# Patient Record
Sex: FEMALE | Race: BLACK OR AFRICAN AMERICAN | NOT HISPANIC OR LATINO | Employment: FULL TIME | URBAN - METROPOLITAN AREA
[De-identification: names, ages, dates, MRNs, and addresses within clinical notes are randomized per-mention and may not be internally consistent; named-entity substitution may affect disease eponyms.]

---

## 2023-09-13 LAB
CHLAMYDIA TRACH., AMPLIFIED: NEGATIVE
CLUE CELLS: PRESENT
N. GONORRHEA, AMPLIFIED: NEGATIVE
NUGENT SCORE: 8
TRICHOMONAS VAGINALIS: NEGATIVE
YEAST: ABNORMAL

## 2023-09-15 LAB — URINE CULTURE: ABNORMAL

## 2023-09-30 ENCOUNTER — OFFICE VISIT (OUTPATIENT)
Dept: URGENT CARE | Facility: CLINIC | Age: 23
End: 2023-09-30
Payer: COMMERCIAL

## 2023-09-30 VITALS
RESPIRATION RATE: 18 BRPM | DIASTOLIC BLOOD PRESSURE: 76 MMHG | BODY MASS INDEX: 18.72 KG/M2 | TEMPERATURE: 98 F | OXYGEN SATURATION: 98 % | SYSTOLIC BLOOD PRESSURE: 121 MMHG | HEART RATE: 73 BPM | WEIGHT: 116 LBS

## 2023-09-30 DIAGNOSIS — R11.0 NAUSEA: ICD-10-CM

## 2023-09-30 DIAGNOSIS — Z34.90 PREGNANCY, UNSPECIFIED GESTATIONAL AGE (HHS-HCC): Primary | ICD-10-CM

## 2023-09-30 LAB — PREGNANCY TEST URINE, POC: POSITIVE

## 2023-09-30 PROCEDURE — 99213 OFFICE O/P EST LOW 20 MIN: CPT | Performed by: PHYSICIAN ASSISTANT

## 2023-09-30 PROCEDURE — 81025 URINE PREGNANCY TEST: CPT | Performed by: PHYSICIAN ASSISTANT

## 2023-09-30 ASSESSMENT — PAIN SCALES - GENERAL: PAINLEVEL: 5

## 2023-09-30 ASSESSMENT — ENCOUNTER SYMPTOMS
ROS GI COMMENTS: HEARTBURN
NAUSEA: 1

## 2023-09-30 NOTE — PROGRESS NOTES
Subjective   Patient ID: Alejandro Butt is a 23 y.o. female.    Patient is a 23-year-old female who complains of upset stomach and nausea that she has been experiencing for the past several days.  Patient denies fever, chills, congestion, sinus pressure, sore throat, cough or other illness symptoms.  Patient reports no dysuria or hematuria.  Upon initial triage by the RN, patient states that she may be pregnant.  Patient reports that she did not take a pregnancy test prior to arrival at this urgent care facility today.  Patient denies abdominal pain, pelvic pain, vaginal bleeding or diarrhea.  Patient denies history of GERD, hiatal hernia, gallbladder disease or other GI conditions.            The following portions of the chart were reviewed this encounter and updated as appropriate:         Review of Systems   Gastrointestinal:  Positive for nausea.        Heartburn   All other systems reviewed and are negative.    Objective   Physical Exam  Vitals and nursing note reviewed.   Constitutional:       Appearance: Normal appearance. She is normal weight.   HENT:      Head: Normocephalic.      Nose: Nose normal.      Mouth/Throat:      Mouth: Mucous membranes are moist.      Pharynx: Oropharynx is clear.   Cardiovascular:      Pulses: Normal pulses.   Pulmonary:      Effort: Pulmonary effort is normal.      Breath sounds: Normal breath sounds.   Abdominal:      General: Abdomen is flat. Bowel sounds are normal. There is no distension.      Palpations: Abdomen is soft.      Tenderness: There is no abdominal tenderness. There is no guarding.   Skin:     General: Skin is warm and dry.   Neurological:      General: No focal deficit present.      Mental Status: She is alert and oriented to person, place, and time.   Psychiatric:         Mood and Affect: Mood normal.         Behavior: Behavior normal.         Thought Content: Thought content normal.         Judgment: Judgment normal.     Patient is awake, alert and oriented  in no acute distress.  Patient appears relaxed comfortable sitting in the exam room chair.  Procedures    Assessment/Plan   Physical exam findings as noted above.  Urine hCG is positive.  Patient was advised of the positive result and the patient was advised to schedule appointment with her OB/GYN for initial evaluation and ongoing prenatal care.    CLINICAL IMPRESSION:  Pregnancy

## 2023-09-30 NOTE — PROGRESS NOTES
Subjective   Patient ID: Alejandro Butt is a 23 y.o. female.    HPI    The following portions of the chart were reviewed this encounter and updated as appropriate:         Review of Systems  Objective   Physical Exam  Procedures    Assessment/Plan

## 2023-09-30 NOTE — LETTER
September 30, 2023     Patient: Alejandro Butt   YOB: 2000   Date of Visit: 9/30/2023       To Whom it May Concern:    Alejandro Butt was seen in my clinic on 9/30/2023.    If you have any questions or concerns, please don't hesitate to call.         Sincerely,          Isaac Arreaga PA-C        CC: No Recipients

## 2023-10-04 PROBLEM — N89.8 WHITE VAGINAL DISCHARGE: Status: ACTIVE | Noted: 2022-07-02

## 2023-10-04 PROBLEM — A60.00 GENITAL HERPES: Status: ACTIVE | Noted: 2022-07-02

## 2023-10-04 PROBLEM — R82.90 ABNORMAL URINE ODOR: Status: ACTIVE | Noted: 2023-10-04

## 2023-10-04 PROBLEM — B95.1 GBS (GROUP B STREPTOCOCCUS) UTI COMPLICATING PREGNANCY (HHS-HCC): Status: ACTIVE | Noted: 2023-10-04

## 2023-10-04 PROBLEM — N89.8 VAGINAL ITCHING: Status: ACTIVE | Noted: 2023-10-04

## 2023-10-04 PROBLEM — O23.40 GBS (GROUP B STREPTOCOCCUS) UTI COMPLICATING PREGNANCY (HHS-HCC): Status: ACTIVE | Noted: 2023-10-04

## 2023-10-04 PROBLEM — R35.0 URINARY FREQUENCY: Status: ACTIVE | Noted: 2023-10-04

## 2023-10-04 PROBLEM — N63.10 BREAST MASS, RIGHT: Status: ACTIVE | Noted: 2023-10-04

## 2023-10-04 PROBLEM — A74.9 CHLAMYDIA: Status: ACTIVE | Noted: 2023-10-04

## 2023-10-04 PROBLEM — D72.819 LEUKOPENIA: Status: ACTIVE | Noted: 2022-07-02

## 2023-10-04 PROBLEM — B37.31 CANDIDIASIS OF VAGINA: Status: ACTIVE | Noted: 2023-10-04

## 2023-10-04 PROBLEM — B37.31 VAGINAL YEAST INFECTION: Status: ACTIVE | Noted: 2023-10-04

## 2023-10-04 PROBLEM — B96.89 BACTERIAL VAGINOSIS: Status: ACTIVE | Noted: 2023-10-04

## 2023-10-04 PROBLEM — L81.9 DISORDER OF PIGMENTATION, UNSPECIFIED: Status: ACTIVE | Noted: 2021-01-27

## 2023-10-04 PROBLEM — N88.8 FRIABLE CERVIX: Status: ACTIVE | Noted: 2023-10-04

## 2023-10-04 PROBLEM — R10.9 ABDOMINAL DISCOMFORT: Status: ACTIVE | Noted: 2023-10-04

## 2023-10-04 PROBLEM — N76.0 BACTERIAL VAGINOSIS: Status: ACTIVE | Noted: 2023-10-04

## 2023-10-04 RX ORDER — CLINDAMYCIN PHOSPHATE 10 UG/ML
1 LOTION TOPICAL
COMMUNITY
Start: 2018-09-24 | End: 2023-10-05 | Stop reason: ALTCHOICE

## 2023-10-04 RX ORDER — IBUPROFEN 600 MG/1
1 TABLET ORAL EVERY 6 HOURS
COMMUNITY
Start: 2020-09-02 | End: 2023-10-05 | Stop reason: ALTCHOICE

## 2023-10-04 RX ORDER — DOXYCYCLINE 100 MG/1
100 CAPSULE ORAL EVERY 12 HOURS
COMMUNITY
Start: 2023-09-12 | End: 2023-10-05 | Stop reason: ALTCHOICE

## 2023-10-04 RX ORDER — METRONIDAZOLE 500 MG/1
500 TABLET ORAL 2 TIMES DAILY
COMMUNITY
End: 2023-10-05 | Stop reason: ALTCHOICE

## 2023-10-04 RX ORDER — TRETINOIN 0.25 MG/G
1 CREAM TOPICAL
COMMUNITY
Start: 2018-09-24 | End: 2023-10-05 | Stop reason: ALTCHOICE

## 2023-10-04 RX ORDER — FLUCONAZOLE 150 MG/1
150 TABLET ORAL
COMMUNITY
End: 2023-10-05 | Stop reason: ALTCHOICE

## 2023-10-05 ENCOUNTER — INITIAL PRENATAL (OUTPATIENT)
Dept: OBSTETRICS AND GYNECOLOGY | Facility: CLINIC | Age: 23
End: 2023-10-05
Payer: COMMERCIAL

## 2023-10-05 ENCOUNTER — APPOINTMENT (OUTPATIENT)
Dept: OBSTETRICS AND GYNECOLOGY | Facility: CLINIC | Age: 23
End: 2023-10-05
Payer: COMMERCIAL

## 2023-10-05 VITALS — SYSTOLIC BLOOD PRESSURE: 100 MMHG | DIASTOLIC BLOOD PRESSURE: 54 MMHG

## 2023-10-05 DIAGNOSIS — B37.9 YEAST INFECTION: ICD-10-CM

## 2023-10-05 DIAGNOSIS — N89.8 VAGINAL ITCHING: ICD-10-CM

## 2023-10-05 DIAGNOSIS — Z3A.01 LESS THAN 8 WEEKS GESTATION OF PREGNANCY (HHS-HCC): Primary | ICD-10-CM

## 2023-10-05 DIAGNOSIS — B37.31 VAGINAL YEAST INFECTION: ICD-10-CM

## 2023-10-05 DIAGNOSIS — Z34.81 PRENATAL CARE, SUBSEQUENT PREGNANCY IN FIRST TRIMESTER (HHS-HCC): ICD-10-CM

## 2023-10-05 DIAGNOSIS — N92.6 MISSED PERIOD: ICD-10-CM

## 2023-10-05 DIAGNOSIS — B37.31 CANDIDIASIS OF VAGINA: ICD-10-CM

## 2023-10-05 DIAGNOSIS — N89.8 WHITE VAGINAL DISCHARGE: ICD-10-CM

## 2023-10-05 PROBLEM — N88.8 FRIABLE CERVIX: Status: RESOLVED | Noted: 2023-10-04 | Resolved: 2023-10-05

## 2023-10-05 PROBLEM — N63.10 BREAST MASS, RIGHT: Status: RESOLVED | Noted: 2023-10-04 | Resolved: 2023-10-05

## 2023-10-05 PROBLEM — O23.40 GBS (GROUP B STREPTOCOCCUS) UTI COMPLICATING PREGNANCY (HHS-HCC): Status: RESOLVED | Noted: 2023-10-04 | Resolved: 2023-10-05

## 2023-10-05 PROBLEM — R82.90 ABNORMAL URINE ODOR: Status: RESOLVED | Noted: 2023-10-04 | Resolved: 2023-10-05

## 2023-10-05 PROBLEM — R35.0 URINARY FREQUENCY: Status: RESOLVED | Noted: 2023-10-04 | Resolved: 2023-10-05

## 2023-10-05 PROBLEM — B95.1 GBS (GROUP B STREPTOCOCCUS) UTI COMPLICATING PREGNANCY (HHS-HCC): Status: RESOLVED | Noted: 2023-10-04 | Resolved: 2023-10-05

## 2023-10-05 LAB — PREGNANCY TEST URINE, POC: POSITIVE

## 2023-10-05 PROCEDURE — 87205 SMEAR GRAM STAIN: CPT

## 2023-10-05 PROCEDURE — 0500F INITIAL PRENATAL CARE VISIT: CPT | Performed by: ADVANCED PRACTICE MIDWIFE

## 2023-10-05 PROCEDURE — 87591 N.GONORRHOEAE DNA AMP PROB: CPT

## 2023-10-05 PROCEDURE — 81025 URINE PREGNANCY TEST: CPT | Performed by: ADVANCED PRACTICE MIDWIFE

## 2023-10-05 PROCEDURE — 87086 URINE CULTURE/COLONY COUNT: CPT

## 2023-10-05 PROCEDURE — 87491 CHLMYD TRACH DNA AMP PROBE: CPT

## 2023-10-05 RX ORDER — TERCONAZOLE 4 MG/G
1 CREAM VAGINAL NIGHTLY
Qty: 1 G | Refills: 0 | Status: SHIPPED | OUTPATIENT
Start: 2023-10-05 | End: 2023-10-12

## 2023-10-05 ASSESSMENT — EDINBURGH POSTNATAL DEPRESSION SCALE (EPDS)
I HAVE BEEN SO UNHAPPY THAT I HAVE BEEN CRYING: NO, NEVER
I HAVE FELT SAD OR MISERABLE: NO, NOT AT ALL
TOTAL SCORE: 1
I HAVE BEEN SO UNHAPPY THAT I HAVE HAD DIFFICULTY SLEEPING: NOT AT ALL
I HAVE BLAMED MYSELF UNNECESSARILY WHEN THINGS WENT WRONG: NOT VERY OFTEN
THE THOUGHT OF HARMING MYSELF HAS OCCURRED TO ME: NEVER
THINGS HAVE BEEN GETTING ON TOP OF ME: NO, I HAVE BEEN COPING AS WELL AS EVER
I HAVE BEEN ABLE TO LAUGH AND SEE THE FUNNY SIDE OF THINGS: AS MUCH AS I ALWAYS COULD
I HAVE FELT SCARED OR PANICKY FOR NO GOOD REASON: NO, NOT AT ALL
I HAVE LOOKED FORWARD WITH ENJOYMENT TO THINGS: AS MUCH AS I EVER DID
I HAVE BEEN ANXIOUS OR WORRIED FOR NO GOOD REASON: NO, NOT AT ALL

## 2023-10-05 NOTE — PROGRESS NOTES
Assessment   Problem List Items Addressed This Visit             ICD-10-CM    Candidiasis of vagina B37.31    Vaginal itching N89.8    White vaginal discharge N89.8    Vaginal yeast infection B37.31     Other Visit Diagnoses         Codes    Less than 8 weeks gestation of pregnancy    -  Primary Z3A.01    Relevant Orders    Hemoglobin    Hematocrit    ABO/Rh    Neisseria gonorrhoeae, Amplified    Chlamydia, PCR    Hepatitis B surface Ag    HIV-1 and HIV-2 antibodies    Rubella IgG    Urine culture    CBC Anemia Panel With Reflex,Pregnancy    Syphilis Screen with Reflex    US MAC OB imaging order    Yeast infection     B37.9    Relevant Medications    terconazole (Terazol 7) 0.4 % vaginal cream    Other Relevant Orders    Vaginitis Gram Stain For Bacterial Vaginosis + Yeast    Missed period     N92.6    Relevant Orders    POCT pregnancy, urine    Prenatal care, subsequent pregnancy in first trimester     Z34.81            Plan   Rx sent for Terazol 7   Routine labs and ultrasound ordered   NOB plan: Oriented to practice, CNM vs. MD care  Reviewed bleeding precautions, warning signs, when to call provider; phone number provided  Routine NOB labs ordered  Return in 4 weeks for routine prenatal care    Michelle Cormier, CHRIS-DILEEP Butt is a 23 y.o.  at 5w5d with a working estimated date of delivery of 2024, by Last Menstrual Period who presents for an initial prenatal visit. This pregnancy is unplanned.    Patient currently experiencing:  vaginal discharge, reports yeast/BV treated along with UTI a few weeks ago at urgent care. Reports continued vaginal itching and discharge.     Bleeding or cramping since LMP: no  Taking prenatal vitamin: No  Ultrasound completed this pregnancy: No    OB History    Para Term  AB Living   3 1 1 0 1 1   SAB IAB Ectopic Multiple Live Births   0 1 0 0 1      # Outcome Date GA Lbr Justino/2nd Weight Sex Delivery Anes PTL Lv   3 Current             2 Term 20   2892 g M Vag-Spont  N JEFFERSON   1 IAB 19             [place]  Depression Scale Total: 1  Prior pregnancy complications:  none    History of hypertension:  No    Past Medical History:   Diagnosis Date    Chlamydia     Encounter for immunization 10/12/2017    Immunization due    Gonorrhea     Other specified health status     No pertinent past medical history    Other specified health status     No pertinent past surgical history    Other specified noninflammatory disorders of vagina 2021    White vaginal discharge    Urinary tract infection       Past Surgical History:   Procedure Laterality Date    BREAST BIOPSY      Removal of mass in rt breast         Social History     Tobacco Use    Smoking status: Never    Smokeless tobacco: Never   Substance Use Topics    Alcohol use: Not Currently    Drug use: Never        Objective   Physical Exam     Expected Total Weight Gain: 12.5 kg (27 lb)-18 kg (39 lb)   [place]regrav[place] BMI: 18.09  BP: 100/54    OBGyn Exam    Postpartum Depression: Low Risk  (10/5/2023)    [place]  Depression Scale     Last EPDS Total Score: 1     Last EPDS Self Harm Result: Never        Pregnancy Problems (from 10/05/23 to present)       No problems associated with this episode.

## 2023-10-06 LAB
BACTERIA UR CULT: NORMAL
C TRACH RRNA SPEC QL NAA+PROBE: NEGATIVE
CLUE CELLS VAG LPF-#/AREA: ABNORMAL /[LPF]
N GONORRHOEA DNA SPEC QL PROBE+SIG AMP: NEGATIVE
NUGENT SCORE: 0
YEAST VAG WET PREP-#/AREA: PRESENT

## 2023-10-25 ENCOUNTER — TELEPHONE (OUTPATIENT)
Dept: OBSTETRICS AND GYNECOLOGY | Facility: CLINIC | Age: 23
End: 2023-10-25
Payer: COMMERCIAL

## 2023-10-30 ENCOUNTER — HOSPITAL ENCOUNTER (EMERGENCY)
Facility: HOSPITAL | Age: 23
Discharge: HOME | End: 2023-10-30
Attending: EMERGENCY MEDICINE
Payer: COMMERCIAL

## 2023-10-30 VITALS
SYSTOLIC BLOOD PRESSURE: 120 MMHG | WEIGHT: 114 LBS | HEART RATE: 68 BPM | OXYGEN SATURATION: 100 % | DIASTOLIC BLOOD PRESSURE: 78 MMHG | HEIGHT: 66 IN | RESPIRATION RATE: 16 BRPM | BODY MASS INDEX: 18.32 KG/M2 | TEMPERATURE: 97.7 F

## 2023-10-30 DIAGNOSIS — O03.4 RETAINED PRODUCTS OF CONCEPTION FOLLOWING ABORTION (HHS-HCC): Primary | ICD-10-CM

## 2023-10-30 DIAGNOSIS — O07.4 RETAINED PRODUCTS OF CONCEPTION AFTER INDUCED TERMINATION OF PREGNANCY: ICD-10-CM

## 2023-10-30 LAB
ERYTHROCYTE [DISTWIDTH] IN BLOOD BY AUTOMATED COUNT: 12.1 % (ref 11.5–14.5)
HCT VFR BLD AUTO: 33.8 % (ref 36–46)
HGB BLD-MCNC: 12.4 G/DL (ref 12–16)
MCH RBC QN AUTO: 32.7 PG (ref 26–34)
MCHC RBC AUTO-ENTMCNC: 36.7 G/DL (ref 32–36)
MCV RBC AUTO: 89 FL (ref 80–100)
NRBC BLD-RTO: 0 /100 WBCS (ref 0–0)
PLATELET # BLD AUTO: 195 X10*3/UL (ref 150–450)
PMV BLD AUTO: 10.9 FL (ref 7.5–11.5)
RBC # BLD AUTO: 3.79 X10*6/UL (ref 4–5.2)
WBC # BLD AUTO: 7.9 X10*3/UL (ref 4.4–11.3)

## 2023-10-30 PROCEDURE — 36415 COLL VENOUS BLD VENIPUNCTURE: CPT

## 2023-10-30 PROCEDURE — 85027 COMPLETE CBC AUTOMATED: CPT

## 2023-10-30 PROCEDURE — 99283 EMERGENCY DEPT VISIT LOW MDM: CPT | Performed by: EMERGENCY MEDICINE

## 2023-10-30 PROCEDURE — 99284 EMERGENCY DEPT VISIT MOD MDM: CPT

## 2023-10-30 PROCEDURE — 99284 EMERGENCY DEPT VISIT MOD MDM: CPT | Performed by: EMERGENCY MEDICINE

## 2023-10-30 RX ORDER — MISOPROSTOL 200 UG/1
800 TABLET ORAL ONCE
Qty: 8 TABLET | Refills: 0 | Status: SHIPPED | OUTPATIENT
Start: 2023-10-30 | End: 2023-10-30 | Stop reason: SDUPTHER

## 2023-10-30 RX ORDER — ONDANSETRON 4 MG/1
4 TABLET, FILM COATED ORAL EVERY 6 HOURS PRN
Qty: 2 TABLET | Refills: 0 | Status: SHIPPED | OUTPATIENT
Start: 2023-10-30

## 2023-10-30 RX ORDER — MISOPROSTOL 200 UG/1
800 TABLET ORAL ONCE
Qty: 4 TABLET | Refills: 0 | Status: SHIPPED | OUTPATIENT
Start: 2023-10-30 | End: 2023-10-30

## 2023-10-30 ASSESSMENT — PAIN SCALES - GENERAL: PAINLEVEL_OUTOF10: 8

## 2023-10-30 ASSESSMENT — LIFESTYLE VARIABLES
EVER FELT BAD OR GUILTY ABOUT YOUR DRINKING: NO
REASON UNABLE TO ASSESS: NO
HAVE YOU EVER FELT YOU SHOULD CUT DOWN ON YOUR DRINKING: NO
EVER HAD A DRINK FIRST THING IN THE MORNING TO STEADY YOUR NERVES TO GET RID OF A HANGOVER: NO
HAVE PEOPLE ANNOYED YOU BY CRITICIZING YOUR DRINKING: NO

## 2023-10-30 ASSESSMENT — COLUMBIA-SUICIDE SEVERITY RATING SCALE - C-SSRS
2. HAVE YOU ACTUALLY HAD ANY THOUGHTS OF KILLING YOURSELF?: NO
1. IN THE PAST MONTH, HAVE YOU WISHED YOU WERE DEAD OR WISHED YOU COULD GO TO SLEEP AND NOT WAKE UP?: NO
6. HAVE YOU EVER DONE ANYTHING, STARTED TO DO ANYTHING, OR PREPARED TO DO ANYTHING TO END YOUR LIFE?: NO

## 2023-10-30 ASSESSMENT — PAIN DESCRIPTION - DESCRIPTORS: DESCRIPTORS: CRAMPING

## 2023-10-30 ASSESSMENT — PAIN DESCRIPTION - LOCATION: LOCATION: ABDOMEN

## 2023-10-30 ASSESSMENT — PAIN - FUNCTIONAL ASSESSMENT: PAIN_FUNCTIONAL_ASSESSMENT: 0-10

## 2023-10-30 ASSESSMENT — PAIN DESCRIPTION - FREQUENCY: FREQUENCY: INTERMITTENT

## 2023-10-30 NOTE — ED TRIAGE NOTES
Patient states she had an  a week ago and the vaginal bleeding and cramping is getting worse. Reports that she is now having large clots with her bleeding.

## 2023-10-30 NOTE — DISCHARGE INSTRUCTIONS
Please take your misoprostol as prescribed by ob/gyn.  Please follow-up with OB/GYN per their instructions.

## 2023-10-30 NOTE — ED PROVIDER NOTES
HPI   Chief Complaint   Patient presents with    Vaginal Bleeding       Patient is 23-year-old -0-2-1 with no significant past medical history presenting with vaginal bleeding.  Patient endorses having an  approximately one and a 1/2 weeks ago at  in [facility].  Patient endorses light bleeding afterwards but endorses having heavier bleeding with passage of 3 clots in the toilet today.  Patient additionally endorses some crampy pain at the time but endorses no current bleeding or pain.  Patient otherwise denies fevers, chills, maliase or other symptoms.  Patient has taken 2 Aleve today with some resolution of pain now.                          No data recorded                Patient History   Past Medical History:   Diagnosis Date    Chlamydia     Encounter for immunization 10/12/2017    Immunization due    Gonorrhea     Other specified health status     No pertinent past medical history    Other specified health status     No pertinent past surgical history    Other specified noninflammatory disorders of vagina 2021    White vaginal discharge    Urinary tract infection      Past Surgical History:   Procedure Laterality Date    BREAST BIOPSY      Removal of mass in rt breast 2015     Family History   Problem Relation Name Age of Onset    No Known Problems Mother      Arthritis Other GRANDPARENT      Social History     Tobacco Use    Smoking status: Never    Smokeless tobacco: Never   Substance Use Topics    Alcohol use: Not Currently    Drug use: Never       Physical Exam   ED Triage Vitals [10/30/23 0152]   Temp Heart Rate Resp BP   36.5 °C (97.7 °F) 68 16 120/78      SpO2 Temp Source Heart Rate Source Patient Position   100 % Temporal Monitor Sitting      BP Location FiO2 (%)     Right arm --       Physical Exam  Vitals and nursing note reviewed.   Constitutional:       General: She is not in acute distress.     Appearance: She is well-developed.   HENT:      Head: Normocephalic and  atraumatic.   Eyes:      Conjunctiva/sclera: Conjunctivae normal.   Cardiovascular:      Rate and Rhythm: Normal rate and regular rhythm.      Heart sounds: No murmur heard.  Pulmonary:      Effort: Pulmonary effort is normal. No respiratory distress.      Breath sounds: Normal breath sounds.   Abdominal:      Palpations: Abdomen is soft.      Tenderness: There is no abdominal tenderness.   Genitourinary:     Comments: Dark blood and clot in vaginal vault.  Soaked 8 cotton swabs but no evidence of active bleeding afterwards.  Cervix 1cm dilated.  Musculoskeletal:         General: No swelling.      Cervical back: Neck supple.   Skin:     General: Skin is warm and dry.      Capillary Refill: Capillary refill takes less than 2 seconds.   Neurological:      Mental Status: She is alert.   Psychiatric:         Mood and Affect: Mood normal.         ED Course & MDM   Diagnoses as of 10/31/23 2345   Retained products of conception after induced termination of pregnancy       Medical Decision Making  Patient is 23-year-old -0-2-1 with no significant past medical history presenting with vaginal bleeding.  Patient with appearance of old blood in vaginal vault but no active bleeding after old blood removed.  Cervix minimally dilated consistent with miscarriage.  No other evidence of infectious symptoms or infection on physical exam.  Point-of-care ultrasound was obtained which was concerning for hyperechoic streaking on the internal lining the uterus.  Given concern for possibly retained products of conception, OB/GYN was consulted.  OB/GYN recommended obtaining CBC which was notable for hemoglobin of 12.4.  They additionally offered patient surgical D&C or medical therapy and patient endorsed preference for misoprostol which was prescribed patient.  Return precautions and follow-up with OB discussed with patient and patient discharged home.    .Patient seen and discussed with Dr. Lidia Carranza MD, PhD  Emergency  Medicine PGY2          Procedure  Procedures      ATTENDING ATTESTATION  23-year-old female status post elective  at approximately 7 weeks performed at a repeatable license location presenting to the emergency department with some crampy suprapubic pain now resolved as well as vaginal bleeding.  The patient hemodynamically stable no distress stable vital signs no tachycardia her conjunctive are pink, the abdomen is soft without guarding rigidity.  No concern for blood loss anemia that would require transfusion.  Vital signs are reassuring and normal.  Patient had sensitive exam performed as mentioned above with some scant bleeding but nothing active appreciated.  A bedside ultrasound was performed with concern for retained products.  The patient remains hemodynamically stable gynecology consulted will see and evaluate the patient disposition ultimately recommendations.    Tyson Murillo, [facility]    The patient was seen by the resident/fellow.  I have personally performed a substantive portion of the encounter.  I have seen and examined the patient; agree with the workup, evaluation, MDM, management and diagnosis.    I have reviewed all the nurses' notes and have confirmed their findings, and have incorporated those findings into this medical record.   The care plan has been discussed with the resident/fellow; I have reviewed the resident/fellow’s note and agree with the documented findings with the exception/addition of information listed above.  On my own examination I agree and incorporated in this document my own history, examination findings and clinical decision making.  All notation in this Addendum supersedes information presented by the resident or MARLEN as listed above.        Juan Carranza MD  Resident  10/31/23 4840

## 2023-10-30 NOTE — CONSULTS
Obstetrical Consult    Reason for Consult: Incomplete AB    Assessment/Plan    22yo  presenting for incomplete AB    Incomplete    - VSS , symptomatically improving, no si/sx of infection  - Hgb 12.4  - BSUS with suspicion for retained POC/blood clot in uterus   - discussed management options with the patient including surgical management, medication management, and expectant management. Reviewed the risks and benefits of each, pt would prefer medication management at this time. Discussed red flag symptoms including heavy bleeding, dizziness, fevers, chills. Discussed risk of failure and possibility of needing additional surgical evacuation in the future. Pt expressed understanding. Reviewed instructions for taking misoprostol and bleeding precautions.   - Rx'd 800 mcg misoprostol, zofran to pharmacy  - will schedule FUV in 2 weeks     Seen and d/w Dr. Taylor Puentes MD  PGY2, Obstetrics and Gynecology     Subjective   22yo  presenting for VB after surgical AB    Pt reports having surgical AB done 1.5 weeks ago (9w2d) followed bby minimal VB until earlier this evening when she passed multiple kiwi-sized clots, cramping, some light-headedness. Denies fevers, chills. Cramping now improved, feels VB has improved as well since arrival.     OBHx: 1x , 1x IAB  GynHx: h/o gonorrhea/chlamydia 1 year ago s/p tx, denies h/o abnl paps  PMH: none  PSH: breast lumpectomy, D&C   Meds: none  All: NKDA  Soc: denies t/d, occ EtOH      Obstetrical History   OB History    Para Term  AB Living   3 1 1 0 1 1   SAB IAB Ectopic Multiple Live Births   0 1 0 0 1      # Outcome Date GA Lbr Justino/2nd Weight Sex Delivery Anes PTL Lv   3 Current            2 Term 20   2892 g M Vag-Spont  N JEFFERSON   1 IAB 19               Past Medical History  Past Medical History:   Diagnosis Date    Chlamydia     Encounter for immunization 10/12/2017    Immunization due    Gonorrhea     Other specified health  status     No pertinent past medical history    Other specified health status     No pertinent past surgical history    Other specified noninflammatory disorders of vagina 08/08/2021    White vaginal discharge    Urinary tract infection         Past Surgical History   Past Surgical History:   Procedure Laterality Date    BREAST BIOPSY      Removal of mass in rt breast 2015       Social History  Social History     Tobacco Use    Smoking status: Never    Smokeless tobacco: Never   Substance Use Topics    Alcohol use: Not Currently     Substance and Sexual Activity   Drug Use Never       Allergies  Kiwi     Medications  (Not in a hospital admission)      Objective    Last Vitals  Temp Pulse Resp BP MAP O2 Sat   36.5 °C (97.7 °F) 68 16 120/78   100 %     Physical Examination  General: no acute distress  HEENT: normocephalic, atraumatic  Heart: warm and well perfused  Lungs: no increased WOB  Abd: soft, minimally tender to palpation over fundus, otherwise nontender  : 10cc dark red blood in vault, small trickle of bleeding from cervix. 1cm dilated on SVE  Extremities: moving all extremities  Neuro: awake and conversant  Psych: appropriate mood and affect     Lab Review  Lab Results   Component Value Date    WBC 7.9 10/30/2023    HGB 12.4 10/30/2023    HCT 33.8 (L) 10/30/2023     10/30/2023     BSUS by ED reviewed -  hyperechoic tissue noted in uterus, suspicious for retained POC/blood clot

## 2023-11-08 ENCOUNTER — APPOINTMENT (OUTPATIENT)
Dept: OBSTETRICS AND GYNECOLOGY | Facility: CLINIC | Age: 23
End: 2023-11-08
Payer: COMMERCIAL

## 2023-11-29 ENCOUNTER — OFFICE VISIT (OUTPATIENT)
Dept: OBSTETRICS AND GYNECOLOGY | Facility: CLINIC | Age: 23
End: 2023-11-29
Payer: COMMERCIAL

## 2023-11-29 VITALS
SYSTOLIC BLOOD PRESSURE: 114 MMHG | BODY MASS INDEX: 18.88 KG/M2 | WEIGHT: 117 LBS | HEART RATE: 76 BPM | DIASTOLIC BLOOD PRESSURE: 76 MMHG

## 2023-11-29 DIAGNOSIS — N89.8 VAGINAL ITCHING: Primary | ICD-10-CM

## 2023-11-29 LAB
POC APPEARANCE, URINE: CLEAR
POC BILIRUBIN, URINE: NEGATIVE
POC BLOOD, URINE: ABNORMAL
POC COLOR, URINE: YELLOW
POC GLUCOSE, URINE: NEGATIVE MG/DL
POC KETONES, URINE: NEGATIVE MG/DL
POC LEUKOCYTES, URINE: ABNORMAL
POC NITRITE,URINE: NEGATIVE
POC PH, URINE: 5.5 PH
POC PROTEIN, URINE: NEGATIVE MG/DL
POC SPECIFIC GRAVITY, URINE: >=1.03
POC UROBILINOGEN, URINE: 0.2 EU/DL

## 2023-11-29 PROCEDURE — 99213 OFFICE O/P EST LOW 20 MIN: CPT | Performed by: STUDENT IN AN ORGANIZED HEALTH CARE EDUCATION/TRAINING PROGRAM

## 2023-11-29 PROCEDURE — 1036F TOBACCO NON-USER: CPT | Performed by: STUDENT IN AN ORGANIZED HEALTH CARE EDUCATION/TRAINING PROGRAM

## 2023-11-29 PROCEDURE — 87205 SMEAR GRAM STAIN: CPT | Performed by: STUDENT IN AN ORGANIZED HEALTH CARE EDUCATION/TRAINING PROGRAM

## 2023-11-29 PROCEDURE — 81003 URINALYSIS AUTO W/O SCOPE: CPT | Performed by: STUDENT IN AN ORGANIZED HEALTH CARE EDUCATION/TRAINING PROGRAM

## 2023-11-29 ASSESSMENT — ENCOUNTER SYMPTOMS
EYES NEGATIVE: 0
CARDIOVASCULAR NEGATIVE: 0
PSYCHIATRIC NEGATIVE: 0
ENDOCRINE NEGATIVE: 0
CONSTITUTIONAL NEGATIVE: 0
GASTROINTESTINAL NEGATIVE: 0
MUSCULOSKELETAL NEGATIVE: 0
NEUROLOGICAL NEGATIVE: 0
RESPIRATORY NEGATIVE: 0
ALLERGIC/IMMUNOLOGIC NEGATIVE: 0
HEMATOLOGIC/LYMPHATIC NEGATIVE: 0

## 2023-11-29 ASSESSMENT — PAIN SCALES - GENERAL: PAINLEVEL: 0-NO PAIN

## 2023-11-29 NOTE — PROGRESS NOTES
Subjective   Patient ID: Alejandro Butt is a 23 y.o. female who presents for Follow-up (Pt here for follow up after ,possible yeast infection/Lmp-23/Last pap-22 wnl/Std-declined/Chaperone-accepted    ).     at  complicated by retained products of conception. Seen in ED, given misoprostol with no change in bleeding, occasional spotting. Reports discharge today, watery like urine. Accompanied by itching. Also reports painful bump on vulva today. Concerned for yeast infection.    Declined STI testing. Not currently sexually active. Declines contraception counseling.     Objective   Physical Exam  Vitals reviewed. Exam conducted with a chaperone present.   Constitutional:       Appearance: Normal appearance.   HENT:      Head: Normocephalic.   Cardiovascular:      Rate and Rhythm: Normal rate and regular rhythm.   Pulmonary:      Effort: Pulmonary effort is normal. No respiratory distress.   Abdominal:      General: There is no distension.      Palpations: Abdomen is soft. There is no mass.      Tenderness: There is no abdominal tenderness. There is no guarding or rebound.   Genitourinary:     Comments: Normal appearing external female genitalia, healing furuncle on left inner thigh without drainage or erythema. Vulva without lesions. Vaginal mucosa normal appearing with white discharge and no lesions. Cervix normal appearing without lesions.     No cervical motion tenderness. Uterus normal sized, mobile. Adnexa without masses or tenderness bilaterally.   Skin:     General: Skin is warm and dry.   Neurological:      General: No focal deficit present.      Mental Status: She is alert.   Psychiatric:         Mood and Affect: Mood normal.         Behavior: Behavior normal.         Thought Content: Thought content normal.         Judgment: Judgment normal.     Bedside ultrasound with thin endometrial echocomplex an dno evidence of retained POC.    Assessment/Plan   Furuncle  - Reviewed  warm compresses, avoiding shaving. Not currently bothersome.      follow up  - No evidence of retained POC  - Declines STI testing, contraception.    Vaginal Itching  - Exam with possible yeast infection, swab sent  - Will message results through [employer]

## 2023-11-30 DIAGNOSIS — B37.31 CANDIDIASIS OF VAGINA: Primary | ICD-10-CM

## 2023-11-30 DIAGNOSIS — B96.89 BACTERIAL VAGINOSIS: Primary | ICD-10-CM

## 2023-11-30 DIAGNOSIS — N76.0 BACTERIAL VAGINOSIS: Primary | ICD-10-CM

## 2023-11-30 LAB
CLUE CELLS VAG LPF-#/AREA: PRESENT /[LPF]
NUGENT SCORE: 8
YEAST VAG WET PREP-#/AREA: ABNORMAL

## 2023-11-30 RX ORDER — METRONIDAZOLE 500 MG/1
500 TABLET ORAL 2 TIMES DAILY
Qty: 14 TABLET | Refills: 0 | Status: SHIPPED | OUTPATIENT
Start: 2023-11-30 | End: 2023-12-07

## 2023-11-30 RX ORDER — TERCONAZOLE 8 MG/G
1 CREAM VAGINAL NIGHTLY
Qty: 20 G | Refills: 0 | Status: SHIPPED | OUTPATIENT
Start: 2023-11-30 | End: 2023-12-03

## 2023-12-08 ENCOUNTER — TELEPHONE (OUTPATIENT)
Dept: OBSTETRICS AND GYNECOLOGY | Facility: CLINIC | Age: 23
End: 2023-12-08
Payer: COMMERCIAL

## 2023-12-08 NOTE — TELEPHONE ENCOUNTER
My chart msg and response back to pt from Dr Chiu. Pt has completed her rx for BV but is still with symptoms. Her provider has requested she make another appt and responded back via my chart, RN to assist. Her VM if full attempted to reach today and sent a my chart msg for her to schedule with scheduling number given.

## 2024-01-10 ENCOUNTER — APPOINTMENT (OUTPATIENT)
Dept: OBSTETRICS AND GYNECOLOGY | Facility: CLINIC | Age: 24
End: 2024-01-10
Payer: COMMERCIAL

## 2024-02-14 ENCOUNTER — APPOINTMENT (OUTPATIENT)
Dept: OBSTETRICS AND GYNECOLOGY | Facility: CLINIC | Age: 24
End: 2024-02-14
Payer: COMMERCIAL

## 2024-11-26 PROBLEM — B37.31 VAGINAL YEAST INFECTION: Status: RESOLVED | Noted: 2023-10-04 | Resolved: 2024-11-26

## 2024-11-26 PROBLEM — N76.0 BACTERIAL VAGINOSIS: Status: RESOLVED | Noted: 2023-10-04 | Resolved: 2024-11-26

## 2024-11-26 PROBLEM — B96.89 BACTERIAL VAGINOSIS: Status: RESOLVED | Noted: 2023-10-04 | Resolved: 2024-11-26

## 2024-11-26 PROBLEM — Z3A.01 LESS THAN 8 WEEKS GESTATION OF PREGNANCY (HHS-HCC): Status: RESOLVED | Noted: 2023-10-05 | Resolved: 2024-11-26

## 2024-11-26 PROBLEM — O03.4 RETAINED PRODUCTS OF CONCEPTION FOLLOWING ABORTION (HHS-HCC): Status: RESOLVED | Noted: 2023-10-30 | Resolved: 2024-11-26

## 2024-11-26 PROBLEM — N89.8 VAGINAL ITCHING: Status: RESOLVED | Noted: 2023-10-04 | Resolved: 2024-11-26

## 2024-11-26 PROBLEM — R10.9 ABDOMINAL DISCOMFORT: Status: RESOLVED | Noted: 2023-10-04 | Resolved: 2024-11-26

## 2024-11-26 PROBLEM — Z34.81 PRENATAL CARE, SUBSEQUENT PREGNANCY IN FIRST TRIMESTER (HHS-HCC): Status: RESOLVED | Noted: 2023-10-05 | Resolved: 2024-11-26

## 2024-11-26 PROBLEM — B37.31 CANDIDIASIS OF VAGINA: Status: RESOLVED | Noted: 2023-10-04 | Resolved: 2024-11-26

## 2024-11-26 PROBLEM — N89.8 WHITE VAGINAL DISCHARGE: Status: RESOLVED | Noted: 2022-07-02 | Resolved: 2024-11-26

## 2024-11-26 PROBLEM — Z86.19 H/O CHLAMYDIA INFECTION: Status: ACTIVE | Noted: 2024-11-26

## 2024-11-26 PROBLEM — A74.9 CHLAMYDIA: Status: RESOLVED | Noted: 2023-10-04 | Resolved: 2024-11-26

## 2024-11-27 ENCOUNTER — APPOINTMENT (OUTPATIENT)
Dept: PRIMARY CARE | Facility: CLINIC | Age: 24
End: 2024-11-27
Payer: COMMERCIAL

## 2025-01-15 ENCOUNTER — APPOINTMENT (OUTPATIENT)
Dept: OBSTETRICS AND GYNECOLOGY | Facility: CLINIC | Age: 25
End: 2025-01-15
Payer: COMMERCIAL

## 2025-01-17 ENCOUNTER — APPOINTMENT (OUTPATIENT)
Dept: PRIMARY CARE | Facility: CLINIC | Age: 25
End: 2025-01-17
Payer: COMMERCIAL

## 2025-01-20 ENCOUNTER — APPOINTMENT (OUTPATIENT)
Dept: PRIMARY CARE | Facility: CLINIC | Age: 25
End: 2025-01-20
Payer: COMMERCIAL

## 2025-02-11 ENCOUNTER — APPOINTMENT (OUTPATIENT)
Dept: PRIMARY CARE | Facility: CLINIC | Age: 25
End: 2025-02-11
Payer: COMMERCIAL

## 2025-03-11 ASSESSMENT — ENCOUNTER SYMPTOMS
NAUSEA: 0
CONSTIPATION: 0
HEADACHES: 0
FLANK PAIN: 0
ABDOMINAL PAIN: 0
FREQUENCY: 1
ANOREXIA: 0
FEVER: 0
HEMATURIA: 0
DIARRHEA: 0
VOMITING: 0
BACK PAIN: 0
SORE THROAT: 0
DYSURIA: 0
CHILLS: 0

## 2025-03-12 ENCOUNTER — OFFICE VISIT (OUTPATIENT)
Dept: OBSTETRICS AND GYNECOLOGY | Facility: CLINIC | Age: 25
End: 2025-03-12
Payer: COMMERCIAL

## 2025-03-12 VITALS
BODY MASS INDEX: 19.63 KG/M2 | WEIGHT: 121.6 LBS | SYSTOLIC BLOOD PRESSURE: 116 MMHG | HEART RATE: 69 BPM | DIASTOLIC BLOOD PRESSURE: 75 MMHG

## 2025-03-12 DIAGNOSIS — Z01.411 ENCOUNTER FOR GYNECOLOGICAL EXAMINATION WITH ABNORMAL FINDING: Primary | ICD-10-CM

## 2025-03-12 DIAGNOSIS — Z11.3 ROUTINE SCREENING FOR STI (SEXUALLY TRANSMITTED INFECTION): ICD-10-CM

## 2025-03-12 DIAGNOSIS — N89.8 VAGINAL DISCHARGE: ICD-10-CM

## 2025-03-12 DIAGNOSIS — R30.0 DYSURIA: ICD-10-CM

## 2025-03-12 PROCEDURE — 99385 PREV VISIT NEW AGE 18-39: CPT | Performed by: ADVANCED PRACTICE MIDWIFE

## 2025-03-12 PROCEDURE — 1036F TOBACCO NON-USER: CPT | Performed by: ADVANCED PRACTICE MIDWIFE

## 2025-03-12 PROCEDURE — 99395 PREV VISIT EST AGE 18-39: CPT | Performed by: ADVANCED PRACTICE MIDWIFE

## 2025-03-12 ASSESSMENT — PATIENT HEALTH QUESTIONNAIRE - PHQ9
2. FEELING DOWN, DEPRESSED OR HOPELESS: NOT AT ALL
SUM OF ALL RESPONSES TO PHQ9 QUESTIONS 1 AND 2: 0
1. LITTLE INTEREST OR PLEASURE IN DOING THINGS: NOT AT ALL

## 2025-03-12 ASSESSMENT — EDINBURGH POSTNATAL DEPRESSION SCALE (EPDS)
I HAVE BEEN SO UNHAPPY THAT I HAVE BEEN CRYING: NO, NEVER
I HAVE BLAMED MYSELF UNNECESSARILY WHEN THINGS WENT WRONG: NO, NEVER
I HAVE BEEN ANXIOUS OR WORRIED FOR NO GOOD REASON: NO, NOT AT ALL
I HAVE BEEN ABLE TO LAUGH AND SEE THE FUNNY SIDE OF THINGS: AS MUCH AS I ALWAYS COULD
TOTAL SCORE: 0
I HAVE LOOKED FORWARD WITH ENJOYMENT TO THINGS: AS MUCH AS I EVER DID
I HAVE FELT SCARED OR PANICKY FOR NO GOOD REASON: NO, NOT AT ALL
I HAVE BEEN SO UNHAPPY THAT I HAVE HAD DIFFICULTY SLEEPING: NOT AT ALL
THE THOUGHT OF HARMING MYSELF HAS OCCURRED TO ME: NEVER
THINGS HAVE BEEN GETTING ON TOP OF ME: NO, I HAVE BEEN COPING AS WELL AS EVER
I HAVE FELT SAD OR MISERABLE: NO, NOT AT ALL

## 2025-03-12 ASSESSMENT — PAIN SCALES - GENERAL: PAINLEVEL_OUTOF10: 0-NO PAIN

## 2025-03-12 NOTE — PROGRESS NOTES
"Ro Butt is a 24 y.o. female who is here for Annual Exam (Here today for annual exam/Last pap 2022/LMP 3/08/2025/Complaint of white thick discharge that smells like \"bleach\", x2weeks/PHQ2/9 score 0/EPDS score 0/).     Concerns today:  thick, white vaginal discharge with a bleach odor x2 weeks  Urinary frequency/urgency x2 weeks    Periods are regular every 28-30 days, lasting 5 days.   Dysmenorrhea:none.   Cyclic symptoms include none.      Sexual Activity: sexually active, male partners  Pain with intercourse? No   Loss of desire? No   Able to have an orgasm? Yes     History of prior STI: chlamydia, gonorrhea  Desires STI screening? Yes    Current contraception: condoms    Last pap: 2022  History of abnormal Pap smear: no  Family history of uterine or ovarian cancer: no  Family history of breast cancer: no  OB History    Para Term  AB Living   3 1 1 0 1 1   SAB IAB Ectopic Multiple Live Births   0 1 0 0 1      # Outcome Date GA Lbr Justino/2nd Weight Sex Type Anes PTL Lv   3             2 Term 20   2.892 kg M Vag-Spont  N JEFFERSON   1 IAB 19              Objective   /75 (BP Location: Left arm, Patient Position: Sitting, BP Cuff Size: Adult)   Pulse 69   Wt 55.2 kg (121 lb 9.6 oz)   LMP 2025   Breastfeeding No   BMI 19.63 kg/m²   Physical Exam  Constitutional:       General: She is not in acute distress.     Appearance: Normal appearance. She is well-developed.   Genitourinary:      Urethral meatus normal.      No lesions in the vagina.      Right Labia: No rash, lesions or skin changes.     Left Labia: No lesions, skin changes or rash.     No vaginal discharge, erythema, tenderness or bleeding (menses).      No vaginal prolapse present.     No vaginal atrophy present.       Right Adnexa: not tender and no mass present.     Left Adnexa: not tender and no mass present.     Cervix is parous.      No cervical motion tenderness, discharge, friability, " lesion or polyp.      Uterus is not fixed, tender or irregular.      No uterine mass detected.     Uterus is retroverted.      Pelvic exam was performed with patient in the lithotomy position.   Breasts:     Right: Normal.      Left: Normal.   Neck:      Thyroid: No thyroid mass, thyromegaly or thyroid tenderness.   Cardiovascular:      Heart sounds: Normal heart sounds.   Pulmonary:      Effort: Pulmonary effort is normal.      Breath sounds: Normal breath sounds.   Abdominal:      Palpations: Abdomen is soft. There is no mass.      Tenderness: There is no abdominal tenderness.   Lymphadenopathy:      Cervical: No cervical adenopathy.   Neurological:      General: No focal deficit present.   Skin:     General: Skin is warm and dry.   Psychiatric:         Mood and Affect: Mood and affect normal.         Behavior: Behavior is cooperative.   Vitals reviewed.     Assessment/Plan   Diagnoses and all orders for this visit:  Encounter for gynecological examination with abnormal finding  -     routine AE  -     healthy lifestyle encouraged  -     pap due 12/2025  -     condoms as needed for contraception and STI prevention  -     Gardasil UTD  -     Recommend PCP visit for routine health maintenance  -     RTO 1 year for AE or sooner PRN  Vaginal discharge  -     C. trachomatis / N. gonorrhoeae, Amplified, Urogenital  -     Trichomonas vaginalis, Amplified  -     Vaginitis Gram Stain For Bacterial Vaginosis + Yeast  Dysuria  -     Urine culture  Routine screening for STI (sexually transmitted infection)  -     Hepatitis C Antibody; Future  -     HIV 1/2 Antigen/Antibody Screen with Reflex to Confirmation; Future  -     C. trachomatis / N. gonorrhoeae, Amplified, Urogenital  -     Trichomonas vaginalis, Amplified  -     Syphilis Screen with Reflex; Future    Follow up in about 1 year (around 3/12/2026) for Annual Exam.    Doris Dover, APRN-CNM, APRN-CNP

## 2025-03-13 DIAGNOSIS — B37.31 YEAST INFECTION INVOLVING THE VAGINA AND SURROUNDING AREA: ICD-10-CM

## 2025-03-13 DIAGNOSIS — N76.0 BV (BACTERIAL VAGINOSIS): Primary | ICD-10-CM

## 2025-03-13 DIAGNOSIS — B96.89 BV (BACTERIAL VAGINOSIS): Primary | ICD-10-CM

## 2025-03-13 RX ORDER — FLUCONAZOLE 150 MG/1
1 TABLET ORAL
COMMUNITY
Start: 2024-05-13 | End: 2025-03-13 | Stop reason: SDUPTHER

## 2025-03-13 RX ORDER — METRONIDAZOLE 500 MG/1
500 TABLET ORAL 2 TIMES DAILY
Qty: 14 TABLET | Refills: 0 | Status: SHIPPED | OUTPATIENT
Start: 2025-03-13 | End: 2025-03-20

## 2025-03-13 RX ORDER — FLUCONAZOLE 150 MG/1
150 TABLET ORAL
Qty: 1 TABLET | Refills: 0 | Status: SHIPPED | OUTPATIENT
Start: 2025-03-13

## 2025-03-14 ENCOUNTER — TELEPHONE (OUTPATIENT)
Dept: OBSTETRICS AND GYNECOLOGY | Facility: CLINIC | Age: 25
End: 2025-03-14
Payer: COMMERCIAL

## 2025-03-14 LAB
BACTERIA UR CULT: NORMAL
BV SCORE VAG QL: ABNORMAL
C TRACH RRNA SPEC QL NAA+PROBE: NOT DETECTED
N GONORRHOEA RRNA SPEC QL NAA+PROBE: NOT DETECTED
QUEST GC CT AMPLIFIED (ALWAYS MESSAGE): NORMAL
T VAGINALIS RRNA SPEC QL NAA+PROBE: NOT DETECTED

## 2025-03-14 NOTE — TELEPHONE ENCOUNTER
Pt notified----- Message from Doris Dover sent at 3/13/2025  2:12 PM EDT -----  BV and yeast on swab  Po flagyl and diflucan rx'd  Can you please let her know?  I usually recommend saving the diflucan to take after she's finished the flagyl  Thank you!

## 2025-05-18 ENCOUNTER — APPOINTMENT (OUTPATIENT)
Dept: PRIMARY CARE | Facility: CLINIC | Age: 25
End: 2025-05-18
Payer: COMMERCIAL

## 2025-07-30 ASSESSMENT — PROMIS GLOBAL HEALTH SCALE
RATE_GENERAL_HEALTH: EXCELLENT
CARRYOUT_PHYSICAL_ACTIVITIES: COMPLETELY
RATE_SOCIAL_SATISFACTION: EXCELLENT
RATE_PHYSICAL_HEALTH: EXCELLENT
EMOTIONAL_PROBLEMS: NEVER
RATE_AVERAGE_FATIGUE: MODERATE
CARRYOUT_SOCIAL_ACTIVITIES: EXCELLENT
RATE_MENTAL_HEALTH: EXCELLENT
RATE_QUALITY_OF_LIFE: EXCELLENT
RATE_AVERAGE_PAIN: 2

## 2025-08-05 ENCOUNTER — APPOINTMENT (OUTPATIENT)
Dept: PRIMARY CARE | Facility: CLINIC | Age: 25
End: 2025-08-05
Payer: COMMERCIAL